# Patient Record
Sex: MALE | Race: WHITE | NOT HISPANIC OR LATINO | URBAN - METROPOLITAN AREA
[De-identification: names, ages, dates, MRNs, and addresses within clinical notes are randomized per-mention and may not be internally consistent; named-entity substitution may affect disease eponyms.]

---

## 2020-07-06 ENCOUNTER — OFFICE VISIT (OUTPATIENT)
Dept: URBAN - METROPOLITAN AREA CLINIC 17 | Facility: CLINIC | Age: 80
End: 2020-07-06
Payer: COMMERCIAL

## 2020-07-06 DIAGNOSIS — E11.9 TYPE 2 DIABETES MELLITUS WITHOUT COMPLICATIONS: ICD-10-CM

## 2020-07-06 DIAGNOSIS — H40.013 OPEN ANGLE WITH BORDERLINE FINDINGS, LOW RISK, BILATERAL: ICD-10-CM

## 2020-07-06 PROCEDURE — 99204 OFFICE O/P NEW MOD 45 MIN: CPT | Performed by: OPTOMETRIST

## 2020-07-06 ASSESSMENT — INTRAOCULAR PRESSURE
OS: 16
OD: 15

## 2020-07-06 NOTE — IMPRESSION/PLAN
Impression: Diagnosis: Type 2 diabetes mellitus without complications. Code: E11.9. Plan: Diabetes type II: no background retinopathy, no signs of neovascularization noted. Discussed ocular and systemic benefits of blood sugar control.  Pt ed provided

## 2020-07-06 NOTE — IMPRESSION/PLAN
Impression: Open angle with borderline findings, low risk, bilateral: H40.013.  Plan: Pt ed on condition RTC TA DE OCT [facility]

## 2020-08-31 ENCOUNTER — OFFICE VISIT (OUTPATIENT)
Dept: URBAN - METROPOLITAN AREA CLINIC 17 | Facility: CLINIC | Age: 80
End: 2020-08-31
Payer: COMMERCIAL

## 2020-08-31 DIAGNOSIS — H25.13 CATARACT - NSC: ICD-10-CM

## 2020-08-31 DIAGNOSIS — H40.1131 PRIMARY OPEN-ANGLE GLAUCOMA, BILATERAL, MILD STAGE: Primary | ICD-10-CM

## 2020-08-31 PROCEDURE — 99214 OFFICE O/P EST MOD 30 MIN: CPT | Performed by: OPTOMETRIST

## 2020-08-31 RX ORDER — BIMATOPROST 0.1 MG/ML
0.01 % SOLUTION/ DROPS OPHTHALMIC
Qty: 7.5 | Refills: 3 | Status: INACTIVE
Start: 2020-08-31 | End: 2020-11-28

## 2020-08-31 ASSESSMENT — INTRAOCULAR PRESSURE
OD: 20
OS: 19

## 2020-08-31 NOTE — IMPRESSION/PLAN
Impression: Primary open-angle glaucoma, bilateral, mild stage: H40.1131.  Plan: OS OCT and HVF show loss, OD early HVF startlumigan QHS OU RTC for TA DE OCT

## 2020-09-21 ENCOUNTER — OFFICE VISIT (OUTPATIENT)
Dept: URBAN - METROPOLITAN AREA CLINIC 17 | Facility: CLINIC | Age: 80
End: 2020-09-21
Payer: COMMERCIAL

## 2020-09-21 DIAGNOSIS — H25.813 COMBINED FORMS OF AGE-RELATED CATARACT, BILATERAL: Primary | ICD-10-CM

## 2020-09-21 DIAGNOSIS — H40.1122 PRIMARY OPEN-ANGLE GLAUCOMA, LEFT EYE, MODERATE STAGE: ICD-10-CM

## 2020-09-21 DIAGNOSIS — H40.1111 PRIMARY OPEN-ANGLE GLAUCOMA, RIGHT EYE, MILD STAGE: ICD-10-CM

## 2020-09-21 PROCEDURE — 92004 COMPRE OPH EXAM NEW PT 1/>: CPT | Performed by: OPHTHALMOLOGY

## 2020-09-21 ASSESSMENT — VISUAL ACUITY
OS: 20/40
OD: 20/50

## 2020-09-21 ASSESSMENT — KERATOMETRY
OS: 43.25
OD: 43.00

## 2020-09-21 ASSESSMENT — INTRAOCULAR PRESSURE
OD: 14
OS: 15

## 2020-09-21 NOTE — IMPRESSION/PLAN
Impression: Primary open-angle glaucoma, left eye, moderate stage: E31.6831. Plan: Discussed diagnosis in detail with patient. Advised patient of condition. Discussed treatment options with patient. Continue lumigan QHS OU.  Discussed advantages of having Istent and would recommend having Istent inject in combination with CEIOL OU

## 2020-09-23 ENCOUNTER — TESTING ONLY (OUTPATIENT)
Dept: URBAN - METROPOLITAN AREA CLINIC 17 | Facility: CLINIC | Age: 80
End: 2020-09-23
Payer: COMMERCIAL

## 2020-09-23 PROCEDURE — 92136 OPHTHALMIC BIOMETRY: CPT | Performed by: OPHTHALMOLOGY

## 2020-09-23 ASSESSMENT — PACHYMETRY
OD: 3.20
OS: 3.28
OD: 24.08
OS: 24.09

## 2020-10-05 ENCOUNTER — SURGERY (OUTPATIENT)
Dept: URBAN - METROPOLITAN AREA SURGERY 7 | Facility: SURGERY | Age: 80
End: 2020-10-05
Payer: COMMERCIAL

## 2020-10-05 PROCEDURE — 0191T INSERTION OF ANTERIOR SEGMENT AQUEOUS DRAINAGE DEVICE, W/OUT EXTRAOCULAR RESERVO: CPT | Performed by: OPHTHALMOLOGY

## 2020-10-05 PROCEDURE — 0376T INSERT ANT SEG AQUEOUS DEVICE; EA ADDTL: CPT | Performed by: OPHTHALMOLOGY

## 2020-10-05 PROCEDURE — 66984 XCAPSL CTRC RMVL W/O ECP: CPT | Performed by: OPHTHALMOLOGY

## 2020-10-06 ENCOUNTER — POST-OPERATIVE VISIT (OUTPATIENT)
Dept: URBAN - METROPOLITAN AREA CLINIC 17 | Facility: CLINIC | Age: 80
End: 2020-10-06
Payer: COMMERCIAL

## 2020-10-06 DIAGNOSIS — Z48.810 ENCOUNTER FOR SURGICAL AFTERCARE FOLLOWING SURGERY ON A SENSE ORGAN: Primary | ICD-10-CM

## 2020-10-06 PROCEDURE — 99024 POSTOP FOLLOW-UP VISIT: CPT | Performed by: OPTOMETRIST

## 2020-10-06 ASSESSMENT — INTRAOCULAR PRESSURE
OD: 14
OS: 16

## 2020-10-06 NOTE — IMPRESSION/PLAN
Impression: S/P CE IOL w/ Istent +20.00 OD - 1 Day. Encounter for surgical aftercare following surgery on a sense organ  Z48.810. Post operative instructions reviewed - Plan: RTC in 1 week for PO2 OD:--Taper Pred-Moxi-Nepaf QID x 1 wk, TID x 1 wk, BID x 1wk, QD x 1wk, then d/c Continue Lumigan QHS OU

## 2020-10-13 ENCOUNTER — POST-OPERATIVE VISIT (OUTPATIENT)
Dept: URBAN - METROPOLITAN AREA CLINIC 17 | Facility: CLINIC | Age: 80
End: 2020-10-13
Payer: COMMERCIAL

## 2020-10-13 PROCEDURE — 99024 POSTOP FOLLOW-UP VISIT: CPT | Performed by: OPTOMETRIST

## 2020-10-13 ASSESSMENT — INTRAOCULAR PRESSURE
OS: 14
OD: 13

## 2020-10-13 ASSESSMENT — VISUAL ACUITY: OD: 20/20-1

## 2020-10-13 NOTE — IMPRESSION/PLAN
Impression: S/P CE IOL w/ Istent +20.00 OD - 8 Days. Encounter for surgical aftercare following surgery on a sense organ  Z48.810.  Plan: 1 wk 2nd eye

## 2020-10-19 ENCOUNTER — SURGERY (OUTPATIENT)
Dept: URBAN - METROPOLITAN AREA SURGERY 7 | Facility: SURGERY | Age: 80
End: 2020-10-19
Payer: COMMERCIAL

## 2020-10-19 PROCEDURE — 66984 XCAPSL CTRC RMVL W/O ECP: CPT | Performed by: OPHTHALMOLOGY

## 2020-10-19 PROCEDURE — 0191T INSERTION OF ANTERIOR SEGMENT AQUEOUS DRAINAGE DEVICE, W/OUT EXTRAOCULAR RESERVO: CPT | Performed by: OPHTHALMOLOGY

## 2020-10-19 PROCEDURE — 0376T INSERT ANT SEG AQUEOUS DEVICE; EA ADDTL: CPT | Performed by: OPHTHALMOLOGY

## 2020-10-20 ENCOUNTER — POST-OPERATIVE VISIT (OUTPATIENT)
Dept: URBAN - METROPOLITAN AREA CLINIC 17 | Facility: CLINIC | Age: 80
End: 2020-10-20
Payer: COMMERCIAL

## 2020-10-20 PROCEDURE — 99024 POSTOP FOLLOW-UP VISIT: CPT | Performed by: OPTOMETRIST

## 2020-10-20 ASSESSMENT — INTRAOCULAR PRESSURE
OD: 12
OS: 18

## 2020-10-20 NOTE — IMPRESSION/PLAN
Impression: S/P CE/Standard IOL/Istent [id number] +20.00 OS - 1 Day. Encounter for surgical aftercare following surgery on a sense organ  Z48.810.  Excellent post op course Plan: --Taper Pred-Moxi-Nepaf QID  x 1 wk, TID x 1 wk, BID x 1wk, QD x 1wk, then d/c

## 2020-10-26 ENCOUNTER — POST-OPERATIVE VISIT (OUTPATIENT)
Dept: URBAN - METROPOLITAN AREA CLINIC 17 | Facility: CLINIC | Age: 80
End: 2020-10-26
Payer: COMMERCIAL

## 2020-10-26 DIAGNOSIS — Z96.1 PRESENCE OF INTRAOCULAR LENS: Primary | ICD-10-CM

## 2020-10-26 PROCEDURE — 99024 POSTOP FOLLOW-UP VISIT: CPT | Performed by: OPTOMETRIST

## 2020-10-26 ASSESSMENT — INTRAOCULAR PRESSURE
OS: 16
OD: 17

## 2020-10-26 ASSESSMENT — KERATOMETRY
OD: 43.13
OS: 43.63

## 2020-10-26 NOTE — IMPRESSION/PLAN
Impression: S/P CE/Standard IOL/Istent [id number] +20.00 OS - 7 Days. Presence of intraocular lens  Z96.1. Plan: 3 wks PO3 --Taper Pred-Moxi-Nepaf TID x 1 wk, BID x 1wk, QD x 1wk, then d/c--Advised patient to use artificial tears for comfort.

## 2020-11-23 ENCOUNTER — OFFICE VISIT (OUTPATIENT)
Dept: URBAN - METROPOLITAN AREA CLINIC 17 | Facility: CLINIC | Age: 80
End: 2020-11-23
Payer: COMMERCIAL

## 2020-11-23 DIAGNOSIS — H52.4 PRESBYOPIA: ICD-10-CM

## 2020-11-23 PROCEDURE — 92133 CPTRZD OPH DX IMG PST SGM ON: CPT | Performed by: OPTOMETRIST

## 2020-11-23 PROCEDURE — 99214 OFFICE O/P EST MOD 30 MIN: CPT | Performed by: OPTOMETRIST

## 2020-11-23 ASSESSMENT — INTRAOCULAR PRESSURE
OD: 16
OS: 16

## 2020-11-23 ASSESSMENT — VISUAL ACUITY
OS: 20/20
OD: 20/25

## 2020-11-23 NOTE — IMPRESSION/PLAN
Impression: Primary open-angle glaucoma, left eye, moderate stage: A23.4885. Plan: Discussed diagnosis in detail with patient. Advised patient of condition. Discussed treatment options with patient. monitor without lumigan for now /76.  Mert lantigua